# Patient Record
Sex: FEMALE | Race: WHITE | URBAN - NONMETROPOLITAN AREA
[De-identification: names, ages, dates, MRNs, and addresses within clinical notes are randomized per-mention and may not be internally consistent; named-entity substitution may affect disease eponyms.]

---

## 2023-01-30 PROBLEM — E66.811 CLASS 1 OBESITY WITH ALVEOLAR HYPOVENTILATION AND BODY MASS INDEX (BMI) OF 34.0 TO 34.9 IN ADULT: Status: ACTIVE | Noted: 2023-01-30

## 2023-01-30 PROBLEM — L02.92 RECURRENT BOILS: Status: ACTIVE | Noted: 2023-01-30

## 2023-01-30 PROBLEM — R37 SEXUAL DYSFUNCTION: Status: ACTIVE | Noted: 2023-01-30

## 2023-01-30 PROBLEM — N60.19 FIBROCYSTIC BREAST CHANGES: Status: ACTIVE | Noted: 2023-01-30

## 2023-01-30 PROBLEM — N90.89 VULVAR LESION: Status: ACTIVE | Noted: 2023-01-30

## 2023-01-30 PROBLEM — B37.2 CANDIDAL INTERTRIGO: Status: ACTIVE | Noted: 2023-01-30

## 2023-01-30 PROBLEM — L40.9 PSORIASIS: Status: ACTIVE | Noted: 2023-01-30

## 2023-01-30 PROBLEM — R05.9 COUGH: Status: ACTIVE | Noted: 2023-01-30

## 2023-01-30 PROBLEM — E78.5 HYPERLIPIDEMIA: Status: ACTIVE | Noted: 2023-01-30

## 2023-01-30 PROBLEM — E66.2 CLASS 1 OBESITY WITH ALVEOLAR HYPOVENTILATION AND BODY MASS INDEX (BMI) OF 34.0 TO 34.9 IN ADULT (MULTI): Status: ACTIVE | Noted: 2023-01-30

## 2023-01-30 PROBLEM — R68.82 LOW LIBIDO: Status: ACTIVE | Noted: 2023-01-30

## 2023-01-30 PROBLEM — N76.4 VULVAR BOIL: Status: ACTIVE | Noted: 2023-01-30

## 2023-01-30 RX ORDER — SULFAMETHOXAZOLE AND TRIMETHOPRIM 800; 160 MG/1; MG/1
1 TABLET ORAL 2 TIMES DAILY
COMMUNITY
Start: 2022-04-20

## 2023-01-30 RX ORDER — BUPROPION HYDROCHLORIDE 300 MG/1
1 TABLET ORAL DAILY
COMMUNITY
Start: 2022-03-09

## 2023-03-10 ENCOUNTER — OFFICE VISIT (OUTPATIENT)
Dept: PRIMARY CARE | Facility: CLINIC | Age: 58
End: 2023-03-10
Payer: COMMERCIAL

## 2023-03-10 ENCOUNTER — APPOINTMENT (OUTPATIENT)
Dept: PRIMARY CARE | Facility: CLINIC | Age: 58
End: 2023-03-10
Payer: COMMERCIAL

## 2023-03-10 VITALS
WEIGHT: 223.6 LBS | DIASTOLIC BLOOD PRESSURE: 80 MMHG | HEART RATE: 65 BPM | SYSTOLIC BLOOD PRESSURE: 118 MMHG | TEMPERATURE: 97 F | OXYGEN SATURATION: 100 % | HEIGHT: 67 IN | BODY MASS INDEX: 35.09 KG/M2

## 2023-03-10 DIAGNOSIS — Z00.00 ENCOUNTER FOR WELLNESS EXAMINATION: ICD-10-CM

## 2023-03-10 DIAGNOSIS — E66.2: ICD-10-CM

## 2023-03-10 DIAGNOSIS — R73.9 HYPERGLYCEMIA: ICD-10-CM

## 2023-03-10 DIAGNOSIS — E78.2 MODERATE MIXED HYPERLIPIDEMIA NOT REQUIRING STATIN THERAPY: ICD-10-CM

## 2023-03-10 DIAGNOSIS — R37 SEXUAL DYSFUNCTION: ICD-10-CM

## 2023-03-10 PROCEDURE — 82670 ASSAY OF TOTAL ESTRADIOL: CPT

## 2023-03-10 PROCEDURE — 85025 COMPLETE CBC W/AUTO DIFF WBC: CPT

## 2023-03-10 PROCEDURE — 99396 PREV VISIT EST AGE 40-64: CPT | Performed by: FAMILY MEDICINE

## 2023-03-10 PROCEDURE — 80053 COMPREHEN METABOLIC PANEL: CPT

## 2023-03-10 PROCEDURE — 80061 LIPID PANEL: CPT

## 2023-03-10 PROCEDURE — 84443 ASSAY THYROID STIM HORMONE: CPT

## 2023-03-10 PROCEDURE — 82681 ASSAY DIR MEAS FR ESTRADIOL: CPT

## 2023-03-10 PROCEDURE — 83036 HEMOGLOBIN GLYCOSYLATED A1C: CPT

## 2023-03-10 PROCEDURE — 36415 COLL VENOUS BLD VENIPUNCTURE: CPT | Performed by: FAMILY MEDICINE

## 2023-03-10 PROCEDURE — 1036F TOBACCO NON-USER: CPT | Performed by: FAMILY MEDICINE

## 2023-03-10 PROCEDURE — 3008F BODY MASS INDEX DOCD: CPT | Performed by: FAMILY MEDICINE

## 2023-03-10 RX ORDER — DOXYCYCLINE 100 MG/1
100 CAPSULE ORAL DAILY
COMMUNITY
Start: 2023-01-21

## 2023-03-10 RX ORDER — CLINDAMYCIN PHOSPHATE 10 MG/G
GEL TOPICAL 2 TIMES DAILY
COMMUNITY
Start: 2022-12-17

## 2023-03-10 RX ORDER — CLOBETASOL PROPIONATE 0.46 MG/ML
SOLUTION TOPICAL
COMMUNITY
Start: 2022-07-15

## 2023-03-10 ASSESSMENT — PROMIS GLOBAL HEALTH SCALE
RATE_SOCIAL_SATISFACTION: GOOD
EMOTIONAL_PROBLEMS: NEVER
RATE_AVERAGE_FATIGUE: MILD
RATE_QUALITY_OF_LIFE: VERY GOOD
RATE_GENERAL_HEALTH: FAIR
CARRYOUT_SOCIAL_ACTIVITIES: VERY GOOD
CARRYOUT_PHYSICAL_ACTIVITIES: COMPLETELY
RATE_MENTAL_HEALTH: VERY GOOD
RATE_PHYSICAL_HEALTH: GOOD
RATE_AVERAGE_PAIN: 0

## 2023-03-10 ASSESSMENT — ENCOUNTER SYMPTOMS: FATIGUE: 1

## 2023-03-10 NOTE — PROGRESS NOTES
"Subjective   Patient ID: Katty Domingo is a 57 y.o. female who presents for Annual Exam (No concerns today. Would like labs done today including A1c and fasting glucose, estrogen and testosterone. Would like to look into HRT if needed based on blood work. Would like Mammogram ordered as well. Vision 20/13 bilateral corrected.  Sofya Cruz MA/).    HPI FEELS WELL MOST DAYS /8 YEARS POST MENOPAUSAL     Review of Systems   Constitutional:  Positive for fatigue.   Endocrine:        EXCESS WEIGHT GAIN    All other systems reviewed and are negative.      Objective   /80 (BP Location: Right arm, Patient Position: Sitting)   Pulse 65   Temp 36.1 °C (97 °F) (Temporal)   Ht 1.708 m (5' 7.25\")   Wt 101 kg (223 lb 9.6 oz)   SpO2 100%   BMI 34.76 kg/m²     Physical Exam  Vitals reviewed.   Constitutional:       Appearance: She is obese.   HENT:      Head: Normocephalic and atraumatic.   Eyes:      Pupils: Pupils are equal, round, and reactive to light.   Cardiovascular:      Rate and Rhythm: Normal rate and regular rhythm.   Pulmonary:      Effort: Pulmonary effort is normal.      Breath sounds: Normal breath sounds.   Abdominal:      General: Abdomen is flat.      Palpations: Abdomen is soft.   Musculoskeletal:         General: Normal range of motion.      Cervical back: Normal range of motion.   Skin:     General: Skin is warm and dry.   Neurological:      General: No focal deficit present.   Psychiatric:         Mood and Affect: Mood normal.       Assessment/Plan   Diagnoses and all orders for this visit:  Moderate mixed hyperlipidemia not requiring statin therapy  -     Comprehensive Metabolic Panel  -     CBC and Auto Differential  -     Lipid panel  -     Tsh With Reflex To Free T4 If Abnormal  Encounter for wellness examination  -     BI mammo bilateral screening tomosynthesis; Future  Class 1 obesity with alveolar hypoventilation without serious comorbidity with body mass index (BMI) of 34.0 to 34.9 " in adult (CMS/[facility])  Sexual dysfunction  -     Estradiol Free and Total  Hyperglycemia  -     Comprehensive Metabolic Panel  -     Hemoglobin A1c  Other orders  -     Follow Up In Primary Care; Future

## 2023-03-11 LAB
ALANINE AMINOTRANSFERASE (SGPT) (U/L) IN SER/PLAS: 25 U/L (ref 7–45)
ALBUMIN (G/DL) IN SER/PLAS: 4.4 G/DL (ref 3.4–5)
ALKALINE PHOSPHATASE (U/L) IN SER/PLAS: 83 U/L (ref 33–110)
ANION GAP IN SER/PLAS: 14 MMOL/L (ref 10–20)
ASPARTATE AMINOTRANSFERASE (SGOT) (U/L) IN SER/PLAS: 16 U/L (ref 9–39)
BASOPHILS (10*3/UL) IN BLOOD BY AUTOMATED COUNT: 0.04 X10E9/L (ref 0–0.1)
BASOPHILS/100 LEUKOCYTES IN BLOOD BY AUTOMATED COUNT: 0.7 % (ref 0–2)
BILIRUBIN TOTAL (MG/DL) IN SER/PLAS: 0.7 MG/DL (ref 0–1.2)
CALCIUM (MG/DL) IN SER/PLAS: 9.8 MG/DL (ref 8.6–10.6)
CARBON DIOXIDE, TOTAL (MMOL/L) IN SER/PLAS: 28 MMOL/L (ref 21–32)
CHLORIDE (MMOL/L) IN SER/PLAS: 102 MMOL/L (ref 98–107)
CHOLESTEROL (MG/DL) IN SER/PLAS: 242 MG/DL (ref 0–199)
CHOLESTEROL IN HDL (MG/DL) IN SER/PLAS: 65.1 MG/DL
CHOLESTEROL/HDL RATIO: 3.7
CREATININE (MG/DL) IN SER/PLAS: 0.8 MG/DL (ref 0.5–1.05)
EOSINOPHILS (10*3/UL) IN BLOOD BY AUTOMATED COUNT: 0.33 X10E9/L (ref 0–0.7)
EOSINOPHILS/100 LEUKOCYTES IN BLOOD BY AUTOMATED COUNT: 5.8 % (ref 0–6)
ERYTHROCYTE DISTRIBUTION WIDTH (RATIO) BY AUTOMATED COUNT: 14.1 % (ref 11.5–14.5)
ERYTHROCYTE MEAN CORPUSCULAR HEMOGLOBIN CONCENTRATION (G/DL) BY AUTOMATED: 31.3 G/DL (ref 32–36)
ERYTHROCYTE MEAN CORPUSCULAR VOLUME (FL) BY AUTOMATED COUNT: 94 FL (ref 80–100)
ERYTHROCYTES (10*6/UL) IN BLOOD BY AUTOMATED COUNT: 4.96 X10E12/L (ref 4–5.2)
ESTIMATED AVERAGE GLUCOSE FOR HBA1C: 91 MG/DL
GFR FEMALE: 84 ML/MIN/1.73M2
GLUCOSE (MG/DL) IN SER/PLAS: 66 MG/DL (ref 74–99)
HEMATOCRIT (%) IN BLOOD BY AUTOMATED COUNT: 46.7 % (ref 36–46)
HEMOGLOBIN (G/DL) IN BLOOD: 14.6 G/DL (ref 12–16)
HEMOGLOBIN A1C/HEMOGLOBIN TOTAL IN BLOOD: 4.8 %
IMMATURE GRANULOCYTES/100 LEUKOCYTES IN BLOOD BY AUTOMATED COUNT: 0.2 % (ref 0–0.9)
LDL: 163 MG/DL (ref 0–99)
LEUKOCYTES (10*3/UL) IN BLOOD BY AUTOMATED COUNT: 5.7 X10E9/L (ref 4.4–11.3)
LYMPHOCYTES (10*3/UL) IN BLOOD BY AUTOMATED COUNT: 1.68 X10E9/L (ref 1.2–4.8)
LYMPHOCYTES/100 LEUKOCYTES IN BLOOD BY AUTOMATED COUNT: 29.6 % (ref 13–44)
MONOCYTES (10*3/UL) IN BLOOD BY AUTOMATED COUNT: 0.35 X10E9/L (ref 0.1–1)
MONOCYTES/100 LEUKOCYTES IN BLOOD BY AUTOMATED COUNT: 6.2 % (ref 2–10)
NEUTROPHILS (10*3/UL) IN BLOOD BY AUTOMATED COUNT: 3.27 X10E9/L (ref 1.2–7.7)
NEUTROPHILS/100 LEUKOCYTES IN BLOOD BY AUTOMATED COUNT: 57.5 % (ref 40–80)
NRBC (PER 100 WBCS) BY AUTOMATED COUNT: 0 /100 WBC (ref 0–0)
PLATELETS (10*3/UL) IN BLOOD AUTOMATED COUNT: 225 X10E9/L (ref 150–450)
POTASSIUM (MMOL/L) IN SER/PLAS: 4.3 MMOL/L (ref 3.5–5.3)
PROTEIN TOTAL: 6.9 G/DL (ref 6.4–8.2)
SODIUM (MMOL/L) IN SER/PLAS: 140 MMOL/L (ref 136–145)
THYROTROPIN (MIU/L) IN SER/PLAS BY DETECTION LIMIT <= 0.05 MIU/L: 1.58 MIU/L (ref 0.44–3.98)
TRIGLYCERIDE (MG/DL) IN SER/PLAS: 69 MG/DL (ref 0–149)
UREA NITROGEN (MG/DL) IN SER/PLAS: 12 MG/DL (ref 6–23)
VLDL: 14 MG/DL (ref 0–40)

## 2023-03-13 NOTE — RESULT ENCOUNTER NOTE
HER THYROID IS NORMAL. GENERAL CHEMISTRIES ARE ALL GOOD. THE LIPID PANEL IS ABNORMAL BUT SOME BETTER. CONTINUE EFFORTS AT LOWER FAT AND EXERCISE TO HELP LOOSE WEIGHT. AND RECHECK IN 6 MONTHS

## 2023-03-15 ENCOUNTER — APPOINTMENT (OUTPATIENT)
Dept: PRIMARY CARE | Facility: CLINIC | Age: 58
End: 2023-03-15
Payer: COMMERCIAL

## 2023-03-15 ENCOUNTER — TELEPHONE (OUTPATIENT)
Dept: PRIMARY CARE | Facility: CLINIC | Age: 58
End: 2023-03-15

## 2023-03-15 NOTE — TELEPHONE ENCOUNTER
TRACY for pt to contact the office.    Also, she has a cologard that came back as .  I was going to check on that and why she didn't do it.  It was from 3/2022.  TALAT NEGRETE

## 2023-03-15 NOTE — TELEPHONE ENCOUNTER
----- Message from Zuly Bhardwaj DO sent at 3/13/2023  8:21 AM EDT -----  HER THYROID IS NORMAL. GENERAL CHEMISTRIES ARE ALL GOOD. THE LIPID PANEL IS ABNORMAL BUT SOME BETTER. CONTINUE EFFORTS AT LOWER FAT AND EXERCISE TO HELP LOOSE WEIGHT. AND RECHECK IN 6 MONTHS

## 2023-03-16 NOTE — TELEPHONE ENCOUNTER
Spoke with Katty she verbalized understanding of results.     She would like to know why her estrogen was not check stated she spoke  with you at her appointment time to have this added to labs.

## 2023-03-20 LAB
ESTRADIOL (SJC): 18 PG/ML
ESTRADIOL FREE: 0.28 PG/ML

## 2023-03-20 NOTE — TELEPHONE ENCOUNTER
Spoke with Katty, she is going to go get the Labs done. Did state she just got the cologuard on March 10th. They will eventually send it in.

## 2023-10-03 ENCOUNTER — APPOINTMENT (OUTPATIENT)
Dept: RADIOLOGY | Facility: HOSPITAL | Age: 58
End: 2023-10-03
Payer: COMMERCIAL

## 2023-10-03 ENCOUNTER — APPOINTMENT (OUTPATIENT)
Dept: PRIMARY CARE | Facility: CLINIC | Age: 58
End: 2023-10-03
Payer: COMMERCIAL

## 2023-10-03 ENCOUNTER — HOSPITAL ENCOUNTER (EMERGENCY)
Facility: HOSPITAL | Age: 58
Discharge: HOME | End: 2023-10-03
Attending: EMERGENCY MEDICINE | Admitting: EMERGENCY MEDICINE
Payer: COMMERCIAL

## 2023-10-03 VITALS
SYSTOLIC BLOOD PRESSURE: 144 MMHG | DIASTOLIC BLOOD PRESSURE: 87 MMHG | RESPIRATION RATE: 16 BRPM | TEMPERATURE: 96.9 F | BODY MASS INDEX: 32.58 KG/M2 | OXYGEN SATURATION: 99 % | HEIGHT: 69 IN | WEIGHT: 220 LBS | HEART RATE: 88 BPM

## 2023-10-03 DIAGNOSIS — S83.8X2A SPRAIN OF OTHER LIGAMENT OF LEFT KNEE, INITIAL ENCOUNTER: Primary | ICD-10-CM

## 2023-10-03 PROCEDURE — 73564 X-RAY EXAM KNEE 4 OR MORE: CPT | Mod: LEFT SIDE | Performed by: RADIOLOGY

## 2023-10-03 PROCEDURE — 2500000004 HC RX 250 GENERAL PHARMACY W/ HCPCS (ALT 636 FOR OP/ED): Performed by: EMERGENCY MEDICINE

## 2023-10-03 PROCEDURE — 73564 X-RAY EXAM KNEE 4 OR MORE: CPT | Mod: LT,FY

## 2023-10-03 PROCEDURE — 99284 EMERGENCY DEPT VISIT MOD MDM: CPT | Performed by: EMERGENCY MEDICINE

## 2023-10-03 RX ORDER — CYCLOBENZAPRINE HCL 10 MG
10 TABLET ORAL 2 TIMES DAILY PRN
Qty: 10 TABLET | Refills: 0 | Status: SHIPPED | OUTPATIENT
Start: 2023-10-03 | End: 2023-10-13

## 2023-10-03 RX ORDER — KETOROLAC TROMETHAMINE 30 MG/ML
30 INJECTION, SOLUTION INTRAMUSCULAR; INTRAVENOUS ONCE
Status: COMPLETED | OUTPATIENT
Start: 2023-10-03 | End: 2023-10-03

## 2023-10-03 RX ORDER — KETOROLAC TROMETHAMINE 30 MG/ML
30 INJECTION, SOLUTION INTRAMUSCULAR; INTRAVENOUS ONCE
Status: DISCONTINUED | OUTPATIENT
Start: 2023-10-03 | End: 2023-10-03

## 2023-10-03 RX ADMIN — KETOROLAC TROMETHAMINE 30 MG: 60 INJECTION, SOLUTION INTRAMUSCULAR at 19:16

## 2023-10-03 ASSESSMENT — COLUMBIA-SUICIDE SEVERITY RATING SCALE - C-SSRS
6. HAVE YOU EVER DONE ANYTHING, STARTED TO DO ANYTHING, OR PREPARED TO DO ANYTHING TO END YOUR LIFE?: NO
2. HAVE YOU ACTUALLY HAD ANY THOUGHTS OF KILLING YOURSELF?: NO
1. IN THE PAST MONTH, HAVE YOU WISHED YOU WERE DEAD OR WISHED YOU COULD GO TO SLEEP AND NOT WAKE UP?: NO

## 2023-10-03 ASSESSMENT — PAIN - FUNCTIONAL ASSESSMENT: PAIN_FUNCTIONAL_ASSESSMENT: 0-10

## 2023-10-03 ASSESSMENT — PAIN DESCRIPTION - ORIENTATION: ORIENTATION: LEFT

## 2023-10-03 ASSESSMENT — PAIN DESCRIPTION - FREQUENCY: FREQUENCY: CONSTANT/CONTINUOUS

## 2023-10-03 ASSESSMENT — PAIN SCALES - GENERAL
PAINLEVEL_OUTOF10: 6
PAINLEVEL_OUTOF10: 8

## 2023-10-03 ASSESSMENT — PAIN DESCRIPTION - DESCRIPTORS: DESCRIPTORS: ACHING;STABBING

## 2023-10-03 ASSESSMENT — PAIN DESCRIPTION - PAIN TYPE: TYPE: CHRONIC PAIN

## 2023-10-03 ASSESSMENT — PAIN DESCRIPTION - LOCATION: LOCATION: KNEE

## 2023-10-03 NOTE — Clinical Note
The left knee x-ray showed no fracture or dislocation.  Severe degenerative joint disease block, please see x-ray report.  Calf also nontender intact distal pulse intact sensation.  Patient was recommended knee immobilizer versus knee brace and decid ed to do outpatient knee brace with the patient is she also requesting crutches which has been ordered and follow-up with orthopedic surgeon.  She is aware of symptoms and states she will need MRI of the knee.  If develop any calf muscle pain chest p ain or short of breath she will return to ER immediately.  Ibuprofen for pain and ache I will also put on muscle relaxant Flexeril.

## 2023-10-03 NOTE — PROGRESS NOTES
"This 4-year-old female patient has been left knee pain for last 6 weeks she denies any particular trauma fall injury patient seen and she could have twisted her knee when she walked long at the [facility] using home, including she denies any Worsening chest pain or short of breath.  Denies any warmness or redness to the knee.  Denies fall or injury.  Ibuprofen and cool compresses without improvement.  Denies any concurrent infection.  Denies history of peptic ulcers or renal problems.  This patient was seen and evaluated by Dr. Layne who has left prior to my arrival.    Subjective   See above       Objective /Physical Exameft knee examination Some arthritic changes obvious on examination there was no warmness redness swelling or kneecap.  Nontender to this intact.  No lateral medial instability is noted.  Grade history pain ankle joint.  Intact distal pulse intact sensation intact distally.  She has good range of motion.  Ankle joint with some stiffness in the joint.  Lungs are clear heart with regular rate and rhythm murmurs auscultated abdomen soft and nontender.      Last Recorded Vitals  Blood pressure (!) 149/96, pulse 94, temperature 35.7 °C (96.3 °F), temperature source Tympanic, resp. rate 18, height 1.753 m (5' 9\"), weight 99.8 kg (220 lb), SpO2 98 %.  Intake/Output last 3 Shifts:  No intake/output data recorded.    Relevant Results                             Assessment/Plan   Active Problems:  There are no active Hospital Problems.    Left knee sprain, see discharge instructions.       I spent 15 minutes in the professional and overall care of this patient.      Susana Ohara MD      "

## 2023-10-03 NOTE — ED TRIAGE NOTES
Patient states her left knee has been bothering her for the last 6 weeks. Today it is much worse and works to put weight on it

## 2023-10-03 NOTE — ED PROVIDER NOTES
History  Patient says over the last month has been having left knee pain she said it started the day after she was walking a long distance.  She denies any history of blood clots.  She denies trauma.  She denies being bedridden, no chest pain, no shortness of breath.   She does not believe that her left leg is more swollen than the right.  She said pain is getting worse where it is difficult to put weight on the leg.      Social history:       Physical Exam  Gen.: Vitals noted, no distress. Afebrile   Head: Normocephalic  ENT: Pupils equal, patent nares.  Normal oral mucosa   Neck: Supple.   Cardiac: Regular rate rhythm   Lungs: Clear to auscultation bilaterally with good aeration  Abdomen: Soft, nontender, nonsurgical.   Back: No midline or paraspinal tenderness.    Extremities:  Moves all extremities.  Left knee generalized tenderness, questionable mild edema, no erythema, no significant tenderness in calf.  No asymmetry of leg no evidence of DVT.  Skin: No rash.   Neuro: No focal neurologic deficits.    ED Course as of 10/03/23 1637   Tue Oct 03, 2023   1619 Patient is low risk for DVT believe this to be secondary to overuse injury and may be arthritis.  Will give patient Toradol IM for discomfort and then potentially discharge on all TRAM.  We discussed at length having her follow-up with orthopedist.  Her  uses Dr. Low most likely will follow-up with Dr. Low.  Believe this to be musculoskeletal knee pain. [DD]      ED Course User Index  [DD] MD Adrián Guillermo MD  10/03/23 3017

## 2023-10-03 NOTE — ED PROVIDER NOTES
HPI   Chief Complaint   Patient presents with    Knee Pain     Left knee x 6weeks gradually getting worse. Today she can't put weight on her left leg   HPI      Review of system: 10 review of system obtained as described in HPI otherwise negative.                 No data recorded                Patient History   Past Medical History:   Diagnosis Date    Chronic sinusitis, unspecified 2017    Sinobronchitis    Encounter for general adult medical examination without abnormal findings 2018    Encounter for preventive health examination    Personal history of other specified conditions 2017    History of lump of right breast    Personal history of other specified conditions 2017    History of acute mastitis     Past Surgical History:   Procedure Laterality Date    TUBAL LIGATION  2017    Tubal Ligation     Family History   Problem Relation Name Age of Onset    Leukemia Father       Social History     Tobacco Use    Smoking status: Former     Types: Cigarettes     Quit date:      Years since quittin.7    Smokeless tobacco: Never   Substance Use Topics    Alcohol use: Not Currently    Drug use: Never       Physical Exam   ED Triage Vitals [10/03/23 1544]   Temp Heart Rate Resp BP   35.7 °C (96.3 °F) 94 18 (!) 149/96      SpO2 Temp Source Heart Rate Source Patient Position   98 % Tympanic Monitor Sitting      BP Location FiO2 (%)     Left arm --       Physical Exam    ED Course & MDM   ED Course as of 10/03/23 1923   Tue Oct 03, 2023   1619 Patient is low risk for DVT believe this to be secondary to overuse injury and may be arthritis.  Will give patient Toradol IM for discomfort and then potentially discharge on all TRAM.  We discussed at length having her follow-up with orthopedist.  Her  uses Dr. Low most likely will follow-up with Dr. Low.  Believe this to be musculoskeletal knee pain. [DD]      ED Course User Index  [DD] Adrián Lincoln MD         Diagnoses as of  10/03/23 1923   Sprain of other ligament of left knee, initial encounter       Medical Decision Making      Procedure  Procedures     Susana Ohara MD  12/02/23 6024

## 2023-11-06 NOTE — PROGRESS NOTES
"Katty Domingo is a 58 y.o. female on day 0 of admission presenting with No Principal Problem: There is no principal problem currently on the Problem List. Please update the Problem List and refresh..    Subjective          Objective     Physical Exam    Last Recorded Vitals  Blood pressure 144/87, pulse 88, temperature 36.1 °C (96.9 °F), temperature source Tympanic, resp. rate 16, height 1.753 m (5' 9\"), weight 99.8 kg (220 lb), SpO2 99 %.  Intake/Output last 3 Shifts:  No intake/output data recorded.    Relevant Results                             Assessment/Plan   Active Problems:  There are no active Hospital Problems.           I spent 15 minutes in the professional and overall care of this patient.      Susana Ohara MD      "

## 2024-10-04 ENCOUNTER — OFFICE VISIT (OUTPATIENT)
Dept: PRIMARY CARE | Facility: CLINIC | Age: 59
End: 2024-10-04
Payer: COMMERCIAL

## 2024-10-04 VITALS
SYSTOLIC BLOOD PRESSURE: 130 MMHG | DIASTOLIC BLOOD PRESSURE: 90 MMHG | OXYGEN SATURATION: 97 % | TEMPERATURE: 96.1 F | BODY MASS INDEX: 32.49 KG/M2 | HEART RATE: 71 BPM | WEIGHT: 220 LBS

## 2024-10-04 DIAGNOSIS — L73.2 HIDRADENITIS: Primary | ICD-10-CM

## 2024-10-04 PROCEDURE — 99214 OFFICE O/P EST MOD 30 MIN: CPT | Performed by: FAMILY MEDICINE

## 2024-10-04 RX ORDER — CLOBETASOL PROPIONATE 0.5 MG/ML
SOLUTION TOPICAL DAILY
Qty: 50 ML | Refills: 3 | Status: SHIPPED | OUTPATIENT
Start: 2024-10-04 | End: 2025-10-04

## 2024-10-04 RX ORDER — NYSTATIN 100000 U/G
OINTMENT TOPICAL 2 TIMES DAILY
Qty: 30 G | Refills: 3 | Status: SHIPPED | OUTPATIENT
Start: 2024-10-04 | End: 2025-10-04

## 2024-10-10 PROBLEM — L73.2 HIDRADENITIS: Status: ACTIVE | Noted: 2024-10-10

## 2024-10-10 ASSESSMENT — ENCOUNTER SYMPTOMS
HEADACHES: 0
ACTIVITY CHANGE: 0
DYSPHORIC MOOD: 0
ABDOMINAL PAIN: 0
NERVOUS/ANXIOUS: 0
EYE ITCHING: 0
DIZZINESS: 0
PALPITATIONS: 0
FEVER: 0
WHEEZING: 0
WEAKNESS: 0
UNEXPECTED WEIGHT CHANGE: 0
JOINT SWELLING: 0
NAUSEA: 0
COUGH: 0
TREMORS: 1
SINUS PRESSURE: 0
DYSURIA: 0
LIGHT-HEADEDNESS: 0
BLOOD IN STOOL: 0
SORE THROAT: 0
DIARRHEA: 0
FLANK PAIN: 0
RHINORRHEA: 0
SLEEP DISTURBANCE: 0
HEMATURIA: 0
NUMBNESS: 0
CONSTIPATION: 0
MYALGIAS: 0
VOMITING: 0
ARTHRALGIAS: 0
SHORTNESS OF BREATH: 0
APPETITE CHANGE: 0
EYE DISCHARGE: 0

## 2024-10-10 NOTE — PROGRESS NOTES
Subjective   Patient ID: Katty Domingo is a 59 y.o. female who presents for Rash (RASH IN THE VULVA AREA AND ABDOMINAL RASH ), Weight Gain (NO DIETARY CHANGES-UNEXPLAINED WEIGHT GAIN.  OVER THE LAST 6 WEEKS), SWELLING (NOTED AT THE BASE OF THE NECK), and Tremors (L SIDED TREMORS ARE GETTING WORSE- DX FOR MANY YEARS).    Rash  Pertinent negatives include no congestion, cough, diarrhea, fever, rhinorrhea, shortness of breath, sore throat or vomiting.   Tremor   SEE ABOVE   SLIGHT ELEVATION OF B/P TODAY AND NOTHING FOR HTN  OVERWEIGHT BUT STABLE     Review of Systems   Constitutional:  Negative for activity change, appetite change, fever and unexpected weight change.   HENT:  Negative for congestion, ear pain, postnasal drip, rhinorrhea, sinus pressure and sore throat.    Eyes:  Negative for discharge, itching and visual disturbance.   Respiratory:  Negative for cough, shortness of breath and wheezing.    Cardiovascular:  Negative for chest pain, palpitations and leg swelling.   Gastrointestinal:  Negative for abdominal pain, blood in stool, constipation, diarrhea, nausea and vomiting.   Endocrine: Negative for cold intolerance, heat intolerance and polyuria.   Genitourinary:  Negative for dysuria, flank pain and hematuria.   Musculoskeletal:  Negative for arthralgias, gait problem, joint swelling and myalgias.   Skin:  Positive for rash.        TREATED BY DERMATOLOGY    Allergic/Immunologic: Negative for environmental allergies and food allergies.   Neurological:  Positive for tremors. Negative for dizziness, syncope, weakness, light-headedness, numbness and headaches.   Psychiatric/Behavioral:  Negative for dysphoric mood and sleep disturbance. The patient is not nervous/anxious.        Objective   /90   Pulse 71   Temp 35.6 °C (96.1 °F)   Wt 99.8 kg (220 lb)   SpO2 97%   BMI 32.49 kg/m²     Physical Exam  Vitals and nursing note reviewed.   Constitutional:       Appearance: Normal appearance. She is  obese.   HENT:      Head: Normocephalic.   Cardiovascular:      Rate and Rhythm: Normal rate and regular rhythm.      Pulses: Normal pulses.      Heart sounds: Normal heart sounds. No murmur heard.     No friction rub. No gallop.   Pulmonary:      Effort: Pulmonary effort is normal. No respiratory distress.      Breath sounds: Normal breath sounds. No wheezing.   Abdominal:      General: Bowel sounds are normal. There is no distension.      Palpations: Abdomen is soft.      Tenderness: There is no abdominal tenderness.   Musculoskeletal:         General: No deformity. Normal range of motion.   Skin:     General: Skin is warm and dry.      Capillary Refill: Capillary refill takes less than 2 seconds.      Findings: Rash present.   Neurological:      General: No focal deficit present.      Mental Status: She is alert and oriented to person, place, and time.   Psychiatric:         Mood and Affect: Mood normal.         Assessment/Plan   Problem List Items Addressed This Visit             ICD-10-CM    Hidradenitis - Primary L73.2    Relevant Medications    clobetasol (Temovate) 0.05 % external solution    nystatin (Mycostatin) ointment    Other Relevant Orders    CBC and Auto Differential    Comprehensive Metabolic Panel    TSH with reflex to Free T4 if abnormal

## 2024-10-30 ENCOUNTER — DOCUMENTATION (OUTPATIENT)
Dept: PRIMARY CARE | Facility: CLINIC | Age: 59
End: 2024-10-30
Payer: COMMERCIAL

## 2024-12-10 ENCOUNTER — APPOINTMENT (OUTPATIENT)
Dept: PRIMARY CARE | Facility: CLINIC | Age: 59
End: 2024-12-10
Payer: COMMERCIAL

## 2024-12-16 ENCOUNTER — APPOINTMENT (OUTPATIENT)
Dept: PRIMARY CARE | Facility: CLINIC | Age: 59
End: 2024-12-16
Payer: COMMERCIAL

## 2024-12-31 ENCOUNTER — APPOINTMENT (OUTPATIENT)
Dept: PRIMARY CARE | Facility: CLINIC | Age: 59
End: 2024-12-31
Payer: COMMERCIAL

## 2024-12-31 VITALS
SYSTOLIC BLOOD PRESSURE: 122 MMHG | DIASTOLIC BLOOD PRESSURE: 82 MMHG | BODY MASS INDEX: 33.59 KG/M2 | WEIGHT: 214 LBS | OXYGEN SATURATION: 100 % | HEART RATE: 69 BPM | HEIGHT: 67 IN | TEMPERATURE: 96.9 F

## 2024-12-31 DIAGNOSIS — E78.2 MIXED HYPERLIPIDEMIA: ICD-10-CM

## 2024-12-31 DIAGNOSIS — Z00.00 ADULT WELLNESS VISIT: Primary | ICD-10-CM

## 2024-12-31 DIAGNOSIS — R73.9 HYPERGLYCEMIA: ICD-10-CM

## 2024-12-31 PROCEDURE — 99396 PREV VISIT EST AGE 40-64: CPT | Performed by: FAMILY MEDICINE

## 2024-12-31 PROCEDURE — 1036F TOBACCO NON-USER: CPT | Performed by: FAMILY MEDICINE

## 2024-12-31 PROCEDURE — 3008F BODY MASS INDEX DOCD: CPT | Performed by: FAMILY MEDICINE

## 2024-12-31 ASSESSMENT — PROMIS GLOBAL HEALTH SCALE
RATE_AVERAGE_PAIN: 0
RATE_MENTAL_HEALTH: VERY GOOD
RATE_GENERAL_HEALTH: VERY GOOD
EMOTIONAL_PROBLEMS: NEVER
RATE_QUALITY_OF_LIFE: VERY GOOD
RATE_AVERAGE_FATIGUE: MILD
CARRYOUT_PHYSICAL_ACTIVITIES: COMPLETELY
CARRYOUT_SOCIAL_ACTIVITIES: VERY GOOD
RATE_SOCIAL_SATISFACTION: VERY GOOD
RATE_PHYSICAL_HEALTH: VERY GOOD

## 2024-12-31 ASSESSMENT — ENCOUNTER SYMPTOMS
VOMITING: 0
RHINORRHEA: 0
DYSURIA: 0
UNEXPECTED WEIGHT CHANGE: 0
EYE ITCHING: 0
ABDOMINAL PAIN: 0
HEMATURIA: 0
HEADACHES: 0
PALPITATIONS: 0
ARTHRALGIAS: 0
COUGH: 0
SORE THROAT: 0
SINUS PRESSURE: 0
FLANK PAIN: 0
NUMBNESS: 0
DYSPHORIC MOOD: 0
BLOOD IN STOOL: 0
DIARRHEA: 0
FEVER: 0
DIZZINESS: 0
APPETITE CHANGE: 0
JOINT SWELLING: 0
WEAKNESS: 0
SHORTNESS OF BREATH: 0
EYE DISCHARGE: 0
CONSTIPATION: 0
NERVOUS/ANXIOUS: 0
LIGHT-HEADEDNESS: 0
ACTIVITY CHANGE: 0
WHEEZING: 0
SLEEP DISTURBANCE: 0
MYALGIAS: 0
NAUSEA: 0

## 2024-12-31 ASSESSMENT — VISUAL ACUITY
OD_CC: 20/25
OS_CC: 20/10

## 2024-12-31 NOTE — PROGRESS NOTES
"Subjective   Patient ID: Katty Domingo is a 59 y.o. female who presents for Annual Exam.    HPI STARTING INTERMITTENT FASTING AND MORE FRUIT AND VEGETABLES   SISTER JUST HAD 4X CABG   PATIENT HAS UNTREATED ABNORMAL LIPID PANEL     Review of Systems   Constitutional:  Negative for activity change, appetite change, fever and unexpected weight change.        PATIENT IS OVERWEIGHT    HENT:  Negative for congestion, ear pain, postnasal drip, rhinorrhea, sinus pressure and sore throat.    Eyes:  Negative for discharge, itching and visual disturbance.   Respiratory:  Negative for cough, shortness of breath and wheezing.    Cardiovascular:  Negative for chest pain, palpitations and leg swelling.        ELEVATED CHOLESTEROL    Gastrointestinal:  Negative for abdominal pain, blood in stool, constipation, diarrhea, nausea and vomiting.   Endocrine: Negative for cold intolerance, heat intolerance and polyuria.   Genitourinary:  Negative for dysuria, flank pain and hematuria.   Musculoskeletal:  Negative for arthralgias, gait problem, joint swelling and myalgias.   Skin:  Negative for rash.   Allergic/Immunologic: Negative for environmental allergies and food allergies.   Neurological:  Negative for dizziness, syncope, weakness, light-headedness, numbness and headaches.   Psychiatric/Behavioral:  Negative for dysphoric mood and sleep disturbance. The patient is not nervous/anxious.        Objective   /82   Pulse 69   Temp 36.1 °C (96.9 °F)   Ht 1.708 m (5' 7.25\")   Wt 97.1 kg (214 lb)   SpO2 100%   BMI 33.27 kg/m²     Physical Exam  Vitals and nursing note reviewed.   Constitutional:       Appearance: Normal appearance. She is obese.   HENT:      Head: Normocephalic.   Cardiovascular:      Rate and Rhythm: Normal rate and regular rhythm.      Pulses: Normal pulses.      Heart sounds: Normal heart sounds. No murmur heard.     No friction rub. No gallop.   Pulmonary:      Effort: Pulmonary effort is normal. No " respiratory distress.      Breath sounds: Normal breath sounds. No wheezing.   Abdominal:      General: There is no distension.      Palpations: Abdomen is soft.      Tenderness: There is no abdominal tenderness.   Musculoskeletal:         General: No deformity. Normal range of motion.   Skin:     General: Skin is warm and dry.      Capillary Refill: Capillary refill takes less than 2 seconds.   Neurological:      General: No focal deficit present.      Mental Status: She is alert and oriented to person, place, and time.   Psychiatric:         Mood and Affect: Mood normal.         Assessment/Plan   Problem List Items Addressed This Visit             ICD-10-CM    Hyperlipidemia E78.5    Relevant Orders    CT cardiac scoring wo IV contrast    Insulin, Fasting    Hyperglycemia R73.9    Relevant Orders    Insulin, Fasting     Other Visit Diagnoses         Codes    Adult wellness visit    -  Primary Z00.00

## 2024-12-31 NOTE — PATIENT INSTRUCTIONS
CT CALCIUM SCORING   START AGGRESSIVE DIETARY CHANGES FOR LOWER FATS IN DIET  INSULIN LEVEL AT [address]

## 2025-01-22 ENCOUNTER — HOSPITAL ENCOUNTER (OUTPATIENT)
Dept: RADIOLOGY | Facility: HOSPITAL | Age: 60
Discharge: HOME | End: 2025-01-22
Payer: COMMERCIAL

## 2025-01-22 DIAGNOSIS — E78.2 MIXED HYPERLIPIDEMIA: ICD-10-CM

## 2025-01-22 PROCEDURE — 75571 CT HRT W/O DYE W/CA TEST: CPT

## 2025-03-26 DIAGNOSIS — L73.2 HIDRADENITIS: ICD-10-CM

## 2025-03-27 RX ORDER — CLOBETASOL PROPIONATE 0.5 MG/ML
SOLUTION TOPICAL DAILY
Qty: 50 ML | Refills: 3 | Status: SHIPPED | OUTPATIENT
Start: 2025-03-27 | End: 2025-04-10